# Patient Record
Sex: MALE | Race: WHITE | Employment: OTHER | URBAN - NONMETROPOLITAN AREA
[De-identification: names, ages, dates, MRNs, and addresses within clinical notes are randomized per-mention and may not be internally consistent; named-entity substitution may affect disease eponyms.]

---

## 2023-11-06 DIAGNOSIS — I48.92 ATRIAL FLUTTER, UNSPECIFIED TYPE (MULTI): ICD-10-CM

## 2023-11-06 DIAGNOSIS — I48.91 ATRIAL FIBRILLATION, TRANSIENT (MULTI): ICD-10-CM

## 2023-11-06 PROBLEM — I47.10 PAROXYSMAL SVT (SUPRAVENTRICULAR TACHYCARDIA) (CMS-HCC): Status: ACTIVE | Noted: 2023-11-06

## 2023-11-06 PROBLEM — R00.1 SINUS BRADYCARDIA: Status: ACTIVE | Noted: 2023-11-06

## 2023-11-06 PROBLEM — R55 SYNCOPE: Status: ACTIVE | Noted: 2023-11-06

## 2023-11-06 PROBLEM — Z79.01 ANTICOAGULATED: Status: ACTIVE | Noted: 2023-11-06

## 2023-11-06 PROBLEM — R00.2 PALPITATIONS: Status: ACTIVE | Noted: 2023-11-06

## 2023-11-06 RX ORDER — CHOLECALCIFEROL (VITAMIN D3) 50 MCG
1 TABLET ORAL DAILY
COMMUNITY

## 2023-11-06 RX ORDER — MULTIVITAMIN WITH IRON
1 TABLET ORAL DAILY
COMMUNITY

## 2023-11-06 RX ORDER — CALCIUM CARBONATE 300MG(750)
400 TABLET,CHEWABLE ORAL DAILY
COMMUNITY

## 2023-11-06 RX ORDER — FLECAINIDE ACETATE 50 MG/1
1 TABLET ORAL 2 TIMES DAILY
COMMUNITY
End: 2023-11-06 | Stop reason: SDUPTHER

## 2023-11-06 RX ORDER — METOPROLOL TARTRATE 25 MG/1
25 TABLET, FILM COATED ORAL DAILY
COMMUNITY

## 2023-11-07 RX ORDER — FLECAINIDE ACETATE 50 MG/1
50 TABLET ORAL 2 TIMES DAILY
Qty: 180 TABLET | Refills: 3 | Status: SHIPPED | OUTPATIENT
Start: 2023-11-07 | End: 2024-11-06

## 2024-02-23 ENCOUNTER — OFFICE VISIT (OUTPATIENT)
Dept: CARDIOLOGY | Facility: CLINIC | Age: 74
End: 2024-02-23
Payer: MEDICARE

## 2024-02-23 VITALS
BODY MASS INDEX: 23.55 KG/M2 | HEART RATE: 73 BPM | WEIGHT: 159 LBS | SYSTOLIC BLOOD PRESSURE: 100 MMHG | DIASTOLIC BLOOD PRESSURE: 80 MMHG | HEIGHT: 69 IN

## 2024-02-23 DIAGNOSIS — I48.3 TYPICAL ATRIAL FLUTTER (MULTI): Primary | ICD-10-CM

## 2024-02-23 DIAGNOSIS — R55 SYNCOPE, UNSPECIFIED SYNCOPE TYPE: ICD-10-CM

## 2024-02-23 DIAGNOSIS — Z79.01 ANTICOAGULATED: ICD-10-CM

## 2024-02-23 DIAGNOSIS — R00.2 PALPITATIONS: ICD-10-CM

## 2024-02-23 DIAGNOSIS — I47.10 PAROXYSMAL SVT (SUPRAVENTRICULAR TACHYCARDIA) (CMS-HCC): ICD-10-CM

## 2024-02-23 DIAGNOSIS — R00.1 SINUS BRADYCARDIA: ICD-10-CM

## 2024-02-23 DIAGNOSIS — I48.91 ATRIAL FIBRILLATION, TRANSIENT (MULTI): ICD-10-CM

## 2024-02-23 PROCEDURE — 1159F MED LIST DOCD IN RCRD: CPT | Performed by: INTERNAL MEDICINE

## 2024-02-23 PROCEDURE — 1160F RVW MEDS BY RX/DR IN RCRD: CPT | Performed by: INTERNAL MEDICINE

## 2024-02-23 PROCEDURE — 99214 OFFICE O/P EST MOD 30 MIN: CPT | Performed by: INTERNAL MEDICINE

## 2024-02-23 PROCEDURE — 3008F BODY MASS INDEX DOCD: CPT | Performed by: INTERNAL MEDICINE

## 2024-02-23 PROCEDURE — 93000 ELECTROCARDIOGRAM COMPLETE: CPT | Performed by: INTERNAL MEDICINE

## 2024-02-23 PROCEDURE — 1036F TOBACCO NON-USER: CPT | Performed by: INTERNAL MEDICINE

## 2024-02-23 RX ORDER — IPRATROPIUM BROMIDE 42 UG/1
2 SPRAY, METERED NASAL 4 TIMES DAILY
COMMUNITY

## 2024-02-23 NOTE — LETTER
"February 23, 2024     Joseph Mariscal MD  [address] [facility]  [address] [facility] [address]    Patient: Sanjay Cordova   YOB: 1950   Date of Visit: 2/23/2024       Dear Dr. Joseph Mariscal MD:    Thank you for referring Sanjay Cordova to me for evaluation. Below are my notes for this consultation.  If you have questions, please do not hesitate to call me. I look forward to following your patient along with you.       Sincerely,     Rich Branch MD      CC: No Recipients  ______________________________________________________________________________________    Subjective   Sanjay Cordova is a 73 y.o. male       Chief Complaint    Follow-up          HPI        Patient is here for follow-up continue management for history of atrial fibrillation/flutter, SVT, long-term anticoagulation.  Since last time I saw him he denies any cardiac complaint.  He feels well.  He denies lightheadedness, dizziness or syncope.  His only complaint is back pain.  He is entertaining about using medical marijuana for his back pain    Assessment     1. History of SVT with prior ablation no recurrence  2. Documentation of atrial fibrillation/flutter currently on suppressive dose of flecainide and anticoagulation with no recurrence  3. Previous history of syncope attributed to 1 and 2 resolved no recurrence  4. Fatigue due to sinus bradycardia related to antiarrhythmic improved since discontinuation of metoprolol  5. Chronic anticoagulation due to history of atrial fibrillation/flutter     Plan     1. I reviewed with the patient results of his EKG and lab work  2. Continue present medical regimen and continue to monitor heart rate  3. We will see him back in 6 months and follow-up      Review of Systems   All other systems reviewed and are negative.           Vitals:    02/23/24 1040   BP: 100/80   BP Location: Left arm   Patient Position: Sitting   Pulse: 73   Weight: 72.1 kg (159 lb)   Height: 1.753 m (5' 9\")    "     EKG done in office today      Objective   Physical Exam  Constitutional:       Appearance: Normal appearance. He is normal weight.   HENT:      Nose: Nose normal.   Neck:      Vascular: No carotid bruit.   Cardiovascular:      Rate and Rhythm: Normal rate.      Pulses: Normal pulses.      Heart sounds: Normal heart sounds.   Pulmonary:      Effort: Pulmonary effort is normal.   Abdominal:      General: Bowel sounds are normal.      Palpations: Abdomen is soft.   Genitourinary:     Rectum: Normal.   Musculoskeletal:         General: Normal range of motion.      Cervical back: Normal range of motion.      Right lower leg: No edema.      Left lower leg: No edema.   Skin:     General: Skin is warm and dry.   Neurological:      General: No focal deficit present.      Mental Status: He is alert.   Psychiatric:         Mood and Affect: Mood normal.         Behavior: Behavior normal.         Thought Content: Thought content normal.         Judgment: Judgment normal.         Allergies  Patient has no known allergies.     Current Medications    Current Outpatient Medications:   •  calcium carbonate (CALCIUM 500 ORAL), Take 1 tablet by mouth once daily., Disp: , Rfl:   •  cholecalciferol (Vitamin D-3) 50 MCG (2000 UT) tablet, Take 1 tablet (2,000 Units) by mouth once daily., Disp: , Rfl:   •  flecainide (Tambocor) 50 mg tablet, Take 1 tablet (50 mg) by mouth 2 times a day., Disp: 180 tablet, Rfl: 3  •  ipratropium (Atrovent) 42 mcg (0.06 %) nasal spray, 2 sprays 4 times a day., Disp: , Rfl:   •  magnesium oxide (Mag-Ox) 400 mg tablet, Take 1 tablet (400 mg) by mouth once daily., Disp: , Rfl:   •  metoprolol tartrate (Lopressor) 25 mg tablet, Take 1 tablet (25 mg) by mouth once daily. As needed, Disp: , Rfl:   •  multivitamin (Multiple Vitamins) tablet, Take 1 tablet by mouth once daily., Disp: , Rfl:   •  rivaroxaban (Xarelto) 20 mg tablet, Take 1 tablet (20 mg) by mouth once daily., Disp: , Rfl:                       Assessment/Plan   1. Typical atrial flutter (CMS/HCC)  Follow Up In Cardiology    ECG 12 Lead      2. Atrial fibrillation, transient (CMS/HCC)        3. Paroxysmal SVT (supraventricular tachycardia)        4. Palpitations        5. Sinus bradycardia        6. Anticoagulated        7. Syncope, unspecified syncope type        8. BMI 23.0-23.9, adult                 Scribe Attestation  By signing my name below, IMaría LPN  , Scribe   attest that this documentation has been prepared under the direction and in the presence of Rich Branch MD.     Provider Attestation - Scribe documentation    All medical record entries made by the Scribe were at my direction and personally dictated by me. I have reviewed the chart and agree that the record accurately reflects my personal performance of the history, physical exam, discussion and plan.

## 2024-02-23 NOTE — PROGRESS NOTES
"Subjective   Sanjay Cordova is a 73 y.o. male       Chief Complaint    Follow-up          HPI        Patient is here for follow-up continue management for history of atrial fibrillation/flutter, SVT, long-term anticoagulation.  Since last time I saw him he denies any cardiac complaint.  He feels well.  He denies lightheadedness, dizziness or syncope.  His only complaint is back pain.  He is entertaining about using medical marijuana for his back pain    Assessment     1. History of SVT with prior ablation no recurrence  2. Documentation of atrial fibrillation/flutter currently on suppressive dose of flecainide and anticoagulation with no recurrence  3. Previous history of syncope attributed to 1 and 2 resolved no recurrence  4. Fatigue due to sinus bradycardia related to antiarrhythmic improved since discontinuation of metoprolol  5. Chronic anticoagulation due to history of atrial fibrillation/flutter     Plan     1. I reviewed with the patient results of his EKG and lab work  2. Continue present medical regimen and continue to monitor heart rate  3. We will see him back in 6 months and follow-up      Review of Systems   All other systems reviewed and are negative.           Vitals:    02/23/24 1040   BP: 100/80   BP Location: Left arm   Patient Position: Sitting   Pulse: 73   Weight: 72.1 kg (159 lb)   Height: 1.753 m (5' 9\")        EKG done in office today      Objective   Physical Exam  Constitutional:       Appearance: Normal appearance. He is normal weight.   HENT:      Nose: Nose normal.   Neck:      Vascular: No carotid bruit.   Cardiovascular:      Rate and Rhythm: Normal rate.      Pulses: Normal pulses.      Heart sounds: Normal heart sounds.   Pulmonary:      Effort: Pulmonary effort is normal.   Abdominal:      General: Bowel sounds are normal.      Palpations: Abdomen is soft.   Genitourinary:     Rectum: Normal.   Musculoskeletal:         General: Normal range of motion.      Cervical back: Normal " range of motion.      Right lower leg: No edema.      Left lower leg: No edema.   Skin:     General: Skin is warm and dry.   Neurological:      General: No focal deficit present.      Mental Status: He is alert.   Psychiatric:         Mood and Affect: Mood normal.         Behavior: Behavior normal.         Thought Content: Thought content normal.         Judgment: Judgment normal.         Allergies  Patient has no known allergies.     Current Medications    Current Outpatient Medications:     calcium carbonate (CALCIUM 500 ORAL), Take 1 tablet by mouth once daily., Disp: , Rfl:     cholecalciferol (Vitamin D-3) 50 MCG (2000 UT) tablet, Take 1 tablet (2,000 Units) by mouth once daily., Disp: , Rfl:     flecainide (Tambocor) 50 mg tablet, Take 1 tablet (50 mg) by mouth 2 times a day., Disp: 180 tablet, Rfl: 3    ipratropium (Atrovent) 42 mcg (0.06 %) nasal spray, 2 sprays 4 times a day., Disp: , Rfl:     magnesium oxide (Mag-Ox) 400 mg tablet, Take 1 tablet (400 mg) by mouth once daily., Disp: , Rfl:     metoprolol tartrate (Lopressor) 25 mg tablet, Take 1 tablet (25 mg) by mouth once daily. As needed, Disp: , Rfl:     multivitamin (Multiple Vitamins) tablet, Take 1 tablet by mouth once daily., Disp: , Rfl:     rivaroxaban (Xarelto) 20 mg tablet, Take 1 tablet (20 mg) by mouth once daily., Disp: , Rfl:                      Assessment/Plan   1. Typical atrial flutter (CMS/HCC)  Follow Up In Cardiology    ECG 12 Lead      2. Atrial fibrillation, transient (CMS/HCC)        3. Paroxysmal SVT (supraventricular tachycardia)        4. Palpitations        5. Sinus bradycardia        6. Anticoagulated        7. Syncope, unspecified syncope type        8. BMI 23.0-23.9, adult                 Scribe Attestation  By signing my name below, María POSEY LPN  , Scribe   attest that this documentation has been prepared under the direction and in the presence of Rich Branch MD.     Provider Attestation - Scribe  documentation    All medical record entries made by the Scribe were at my direction and personally dictated by me. I have reviewed the chart and agree that the record accurately reflects my personal performance of the history, physical exam, discussion and plan.

## 2024-02-26 DIAGNOSIS — I48.91 ATRIAL FIBRILLATION, TRANSIENT (MULTI): ICD-10-CM

## 2024-07-09 ENCOUNTER — TELEPHONE (OUTPATIENT)
Dept: CARDIOLOGY | Facility: CLINIC | Age: 74
End: 2024-07-09
Payer: MEDICARE

## 2024-07-09 NOTE — TELEPHONE ENCOUNTER
Patient phoned requesting Xarelto hold instructions. He is scheduled for a Colonoscopy at [facility] with Dr. Schmitz 7/15/2024. Please advise.    To Dr. Rich Branch MD

## 2024-08-30 ENCOUNTER — APPOINTMENT (OUTPATIENT)
Dept: CARDIOLOGY | Facility: CLINIC | Age: 74
End: 2024-08-30
Payer: MEDICARE

## 2024-08-30 VITALS
SYSTOLIC BLOOD PRESSURE: 114 MMHG | WEIGHT: 153.8 LBS | BODY MASS INDEX: 22.78 KG/M2 | DIASTOLIC BLOOD PRESSURE: 70 MMHG | HEIGHT: 69 IN | HEART RATE: 65 BPM

## 2024-08-30 DIAGNOSIS — I48.91 ATRIAL FIBRILLATION, TRANSIENT (MULTI): Primary | ICD-10-CM

## 2024-08-30 DIAGNOSIS — Z78.9 NEVER SMOKED TOBACCO: ICD-10-CM

## 2024-08-30 DIAGNOSIS — R00.1 SINUS BRADYCARDIA: ICD-10-CM

## 2024-08-30 DIAGNOSIS — Z79.01 ANTICOAGULATED: ICD-10-CM

## 2024-08-30 DIAGNOSIS — R00.2 PALPITATIONS: ICD-10-CM

## 2024-08-30 DIAGNOSIS — I47.10 PAROXYSMAL SVT (SUPRAVENTRICULAR TACHYCARDIA) (CMS-HCC): ICD-10-CM

## 2024-08-30 DIAGNOSIS — I48.3 TYPICAL ATRIAL FLUTTER (MULTI): ICD-10-CM

## 2024-08-30 PROCEDURE — 1036F TOBACCO NON-USER: CPT | Performed by: INTERNAL MEDICINE

## 2024-08-30 PROCEDURE — 93000 ELECTROCARDIOGRAM COMPLETE: CPT | Performed by: INTERNAL MEDICINE

## 2024-08-30 PROCEDURE — 1160F RVW MEDS BY RX/DR IN RCRD: CPT | Performed by: INTERNAL MEDICINE

## 2024-08-30 PROCEDURE — 3008F BODY MASS INDEX DOCD: CPT | Performed by: INTERNAL MEDICINE

## 2024-08-30 PROCEDURE — 99214 OFFICE O/P EST MOD 30 MIN: CPT | Performed by: INTERNAL MEDICINE

## 2024-08-30 PROCEDURE — 1159F MED LIST DOCD IN RCRD: CPT | Performed by: INTERNAL MEDICINE

## 2024-08-30 ASSESSMENT — ENCOUNTER SYMPTOMS: DIZZINESS: 1

## 2024-08-30 NOTE — LETTER
August 30, 2024     Rich Branch MD  [address] [facility] [address]  [facility] [address]    Patient: Sanjay Cordova   YOB: 1950   Date of Visit: 8/30/2024       Dear Dr. Rich Branch MD:    Thank you for referring Sanjay Cordova to me for evaluation. Below are my notes for this consultation.  If you have questions, please do not hesitate to call me. I look forward to following your patient along with you.       Sincerely,     Rich Branch MD      CC: Joseph Mariscal MD  ______________________________________________________________________________________    Subjective   Sanjay Cordova is a 73 y.o. male       Chief Complaint    Follow-up          HPI        Patient is here for follow-up continue management for previous evaluation for atrial fibrillation/flutter with remote history of SVT ablation.  Currently has been on flecainide and anticoagulation and he has been stable.  He has not had any recurrence.  He described functional class I.  He remains very active.  He uses treadmill and walk 2 to 3 miles.  He denies chest pain, palpitation, lightheadedness, dizziness or syncope.    Assessment     1. History of SVT with prior ablation no recurrence  2. Documentation of atrial fibrillation/flutter currently on suppressive dose of flecainide and anticoagulation with no recurrence  3. Previous history of syncope attributed to 1 and 2 resolved no recurrence  4. Fatigue due to sinus bradycardia related to antiarrhythmic improved since discontinuation of metoprolol  5. Chronic anticoagulation due to history of atrial fibrillation/flutter.  Well-tolerated     Plan     1. I reviewed with the patient results of his EKG and lab work  2. Continue present medical regimen and continue to monitor heart rate  3. We will see him back in 6 months and follow-up      Review of Systems   Neurological:  Positive for dizziness.   All other systems reviewed and are negative.           Vitals:    08/30/24 1028  "  BP: 114/70   BP Location: Right arm   Patient Position: Sitting   Pulse: 65   Weight: 69.8 kg (153 lb 12.8 oz)   Height: 1.753 m (5' 9\")        Objective   Physical Exam  Constitutional:       Appearance: Normal appearance.   HENT:      Nose: Nose normal.   Neck:      Vascular: No carotid bruit.   Cardiovascular:      Rate and Rhythm: Normal rate.      Pulses: Normal pulses.      Heart sounds: Normal heart sounds.   Pulmonary:      Effort: Pulmonary effort is normal.   Abdominal:      General: Bowel sounds are normal.      Palpations: Abdomen is soft.   Musculoskeletal:         General: Normal range of motion.      Cervical back: Normal range of motion.      Right lower leg: No edema.      Left lower leg: No edema.   Skin:     General: Skin is warm and dry.   Neurological:      General: No focal deficit present.      Mental Status: He is alert.   Psychiatric:         Mood and Affect: Mood normal.         Behavior: Behavior normal.         Thought Content: Thought content normal.         Judgment: Judgment normal.         Allergies  Patient has no known allergies.     Current Medications    Current Outpatient Medications:   •  calcium carbonate (CALCIUM 500 ORAL), Take 1 tablet by mouth once daily., Disp: , Rfl:   •  cholecalciferol (Vitamin D-3) 50 MCG (2000 UT) tablet, Take 1 tablet (2,000 Units) by mouth once daily., Disp: , Rfl:   •  flecainide (Tambocor) 50 mg tablet, Take 1 tablet (50 mg) by mouth 2 times a day., Disp: 180 tablet, Rfl: 3  •  ipratropium (Atrovent) 42 mcg (0.06 %) nasal spray, 2 sprays 4 times a day., Disp: , Rfl:   •  magnesium oxide (Mag-Ox) 400 mg tablet, Take 1 tablet (400 mg) by mouth once daily., Disp: , Rfl:   •  multivitamin (Multiple Vitamins) tablet, Take 1 tablet by mouth once daily., Disp: , Rfl:   •  rivaroxaban (Xarelto) 20 mg tablet, Take 1 tablet (20 mg) by mouth once daily., Disp: 90 tablet, Rfl: 3                     Assessment/Plan   1. Atrial fibrillation, transient (Multi) "  Follow Up In Cardiology    ECG 12 Lead    rivaroxaban (Xarelto) 20 mg tablet      2. Typical atrial flutter (Multi)  Follow Up In Cardiology      3. Paroxysmal SVT (supraventricular tachycardia) (CMS-HCC)        4. Palpitations        5. Sinus bradycardia        6. Anticoagulated        7. BMI 23.0-23.9, adult        8. Never smoked tobacco                 Scribe Attestation  By signing my name below, Danae POSEY LPN, Scribe   attest that this documentation has been prepared under the direction and in the presence of Rich Branch MD.     Provider Attestation - Scribe documentation    All medical record entries made by the Scribe were at my direction and personally dictated by me. I have reviewed the chart and agree that the record accurately reflects my personal performance of the history, physical exam, discussion and plan.

## 2024-08-30 NOTE — PATIENT INSTRUCTIONS
Please bring all medicines, vitamins, and herbal supplements with you when you come to the office.    Prescriptions will not be filled unless you are compliant with your follow up appointments or have a follow up appointment scheduled as per instruction of your physician. Refills should be requested at the time of your visit.     6 months

## 2024-08-30 NOTE — PROGRESS NOTES
"Subjective   Sanjay Cordova is a 73 y.o. male       Chief Complaint    Follow-up          HPI        Patient is here for follow-up continue management for previous evaluation for atrial fibrillation/flutter with remote history of SVT ablation.  Currently has been on flecainide and anticoagulation and he has been stable.  He has not had any recurrence.  He described functional class I.  He remains very active.  He uses treadmill and walk 2 to 3 miles.  He denies chest pain, palpitation, lightheadedness, dizziness or syncope.    Assessment     1. History of SVT with prior ablation no recurrence  2. Documentation of atrial fibrillation/flutter currently on suppressive dose of flecainide and anticoagulation with no recurrence  3. Previous history of syncope attributed to 1 and 2 resolved no recurrence  4. Fatigue due to sinus bradycardia related to antiarrhythmic improved since discontinuation of metoprolol  5. Chronic anticoagulation due to history of atrial fibrillation/flutter.  Well-tolerated     Plan     1. I reviewed with the patient results of his EKG and lab work  2. Continue present medical regimen and continue to monitor heart rate  3. We will see him back in 6 months and follow-up      Review of Systems   Neurological:  Positive for dizziness.   All other systems reviewed and are negative.           Vitals:    08/30/24 1028   BP: 114/70   BP Location: Right arm   Patient Position: Sitting   Pulse: 65   Weight: 69.8 kg (153 lb 12.8 oz)   Height: 1.753 m (5' 9\")        Objective   Physical Exam  Constitutional:       Appearance: Normal appearance.   HENT:      Nose: Nose normal.   Neck:      Vascular: No carotid bruit.   Cardiovascular:      Rate and Rhythm: Normal rate.      Pulses: Normal pulses.      Heart sounds: Normal heart sounds.   Pulmonary:      Effort: Pulmonary effort is normal.   Abdominal:      General: Bowel sounds are normal.      Palpations: Abdomen is soft.   Musculoskeletal:         General: " Normal range of motion.      Cervical back: Normal range of motion.      Right lower leg: No edema.      Left lower leg: No edema.   Skin:     General: Skin is warm and dry.   Neurological:      General: No focal deficit present.      Mental Status: He is alert.   Psychiatric:         Mood and Affect: Mood normal.         Behavior: Behavior normal.         Thought Content: Thought content normal.         Judgment: Judgment normal.         Allergies  Patient has no known allergies.     Current Medications    Current Outpatient Medications:     calcium carbonate (CALCIUM 500 ORAL), Take 1 tablet by mouth once daily., Disp: , Rfl:     cholecalciferol (Vitamin D-3) 50 MCG (2000 UT) tablet, Take 1 tablet (2,000 Units) by mouth once daily., Disp: , Rfl:     flecainide (Tambocor) 50 mg tablet, Take 1 tablet (50 mg) by mouth 2 times a day., Disp: 180 tablet, Rfl: 3    ipratropium (Atrovent) 42 mcg (0.06 %) nasal spray, 2 sprays 4 times a day., Disp: , Rfl:     magnesium oxide (Mag-Ox) 400 mg tablet, Take 1 tablet (400 mg) by mouth once daily., Disp: , Rfl:     multivitamin (Multiple Vitamins) tablet, Take 1 tablet by mouth once daily., Disp: , Rfl:     rivaroxaban (Xarelto) 20 mg tablet, Take 1 tablet (20 mg) by mouth once daily., Disp: 90 tablet, Rfl: 3                     Assessment/Plan   1. Atrial fibrillation, transient (Multi)  Follow Up In Cardiology    ECG 12 Lead    rivaroxaban (Xarelto) 20 mg tablet      2. Typical atrial flutter (Multi)  Follow Up In Cardiology      3. Paroxysmal SVT (supraventricular tachycardia) (CMS-[facility])        4. Palpitations        5. Sinus bradycardia        6. Anticoagulated        7. BMI 23.0-23.9, adult        8. Never smoked tobacco                 Scribe Attestation  By signing my name below, Danae POSEY LPN   , Scrtasha   attest that this documentation has been prepared under the direction and in the presence of Rich Branch MD.     Provider Attestation - Scribe  documentation    All medical record entries made by the Scribe were at my direction and personally dictated by me. I have reviewed the chart and agree that the record accurately reflects my personal performance of the history, physical exam, discussion and plan.

## 2024-10-24 DIAGNOSIS — I48.91 ATRIAL FIBRILLATION, TRANSIENT (MULTI): ICD-10-CM

## 2024-10-24 DIAGNOSIS — I48.92 ATRIAL FLUTTER, UNSPECIFIED TYPE (MULTI): ICD-10-CM

## 2024-10-24 RX ORDER — FLECAINIDE ACETATE 50 MG/1
50 TABLET ORAL 2 TIMES DAILY
Qty: 180 TABLET | Refills: 3 | Status: SHIPPED | OUTPATIENT
Start: 2024-10-24 | End: 2025-10-24

## 2025-03-03 ENCOUNTER — APPOINTMENT (OUTPATIENT)
Dept: CARDIOLOGY | Facility: CLINIC | Age: 75
End: 2025-03-03
Payer: MEDICARE

## 2025-03-03 VITALS
SYSTOLIC BLOOD PRESSURE: 118 MMHG | HEIGHT: 69 IN | DIASTOLIC BLOOD PRESSURE: 80 MMHG | HEART RATE: 63 BPM | BODY MASS INDEX: 23.55 KG/M2 | WEIGHT: 159 LBS

## 2025-03-03 DIAGNOSIS — I48.91 ATRIAL FIBRILLATION, TRANSIENT (MULTI): Primary | ICD-10-CM

## 2025-03-03 DIAGNOSIS — I48.3 TYPICAL ATRIAL FLUTTER (MULTI): ICD-10-CM

## 2025-03-03 DIAGNOSIS — R00.1 SINUS BRADYCARDIA: ICD-10-CM

## 2025-03-03 DIAGNOSIS — Z78.9 NEVER SMOKED TOBACCO: ICD-10-CM

## 2025-03-03 DIAGNOSIS — R00.2 PALPITATIONS: ICD-10-CM

## 2025-03-03 DIAGNOSIS — I47.10 PAROXYSMAL SVT (SUPRAVENTRICULAR TACHYCARDIA) (CMS-HCC): ICD-10-CM

## 2025-03-03 DIAGNOSIS — Z79.01 ANTICOAGULATED: ICD-10-CM

## 2025-03-03 PROCEDURE — 1160F RVW MEDS BY RX/DR IN RCRD: CPT | Performed by: INTERNAL MEDICINE

## 2025-03-03 PROCEDURE — 1036F TOBACCO NON-USER: CPT | Performed by: INTERNAL MEDICINE

## 2025-03-03 PROCEDURE — 3008F BODY MASS INDEX DOCD: CPT | Performed by: INTERNAL MEDICINE

## 2025-03-03 PROCEDURE — 1159F MED LIST DOCD IN RCRD: CPT | Performed by: INTERNAL MEDICINE

## 2025-03-03 PROCEDURE — 99214 OFFICE O/P EST MOD 30 MIN: CPT | Performed by: INTERNAL MEDICINE

## 2025-03-03 PROCEDURE — 93000 ELECTROCARDIOGRAM COMPLETE: CPT | Performed by: INTERNAL MEDICINE

## 2025-03-03 NOTE — PROGRESS NOTES
"Subjective   Sanjay Cordova is a 74 y.o. male       Chief Complaint    Follow-up          HPI        Patient is here for follow-up continue management for history of atrial fibrillation, SVT and long-term anticoagulation.  Since last time I saw him he denies any complaint of chest pain, palpitation, lightheadedness, dizziness or syncope.  The patient remain active.  [place] functional class I.  His recent laboratory data noted and reviewed with him.    Assessment     1. History of SVT with prior ablation no recurrence  2. Documentation of atrial fibrillation/flutter currently on suppressive dose of flecainide and anticoagulation with no recurrence  3. Previous history of syncope attributed to 1 and 2 resolved no recurrence  4. Fatigue due to sinus bradycardia related to antiarrhythmic improved since discontinuation of metoprolol  5. Chronic anticoagulation due to history of atrial fibrillation/flutter.  Well-tolerated     Plan     1. I reviewed with the patient results of his EKG and lab work  2. Continue present medical regimen and continue to monitor heart rate  3. We will see him back in 6 months and follow-up      Review of Systems   All other systems reviewed and are negative.           Vitals:    03/03/25 1020   BP: 118/80   BP Location: Left arm   Patient Position: Sitting   Pulse: 63   Weight: 72.1 kg (159 lb)   Height: 1.753 m (5' 9\")      EKG done in office today      Objective   Physical Exam  Constitutional:       Appearance: Normal appearance.   HENT:      Nose: Nose normal.   Neck:      Vascular: No carotid bruit.   Cardiovascular:      Rate and Rhythm: Normal rate.      Pulses: Normal pulses.      Heart sounds: Normal heart sounds.   Pulmonary:      Effort: Pulmonary effort is normal.   Abdominal:      General: Bowel sounds are normal.      Palpations: Abdomen is soft.   Musculoskeletal:         General: Normal range of motion.      Cervical back: Normal range of motion.      Right lower leg: No edema. "      Left lower leg: No edema.   Skin:     General: Skin is warm and dry.   Neurological:      General: No focal deficit present.      Mental Status: He is alert.   Psychiatric:         Mood and Affect: Mood normal.         Behavior: Behavior normal.         Thought Content: Thought content normal.         Judgment: Judgment normal.         Allergies  Patient has no known allergies.     Current Medications    Current Outpatient Medications:     calcium carbonate (CALCIUM 500 ORAL), Take 1 tablet by mouth once daily., Disp: , Rfl:     cholecalciferol (Vitamin D-3) 50 MCG (2000 UT) tablet, Take 1 tablet (2,000 Units) by mouth once daily., Disp: , Rfl:     flecainide (Tambocor) 50 mg tablet, Take 1 tablet (50 mg) by mouth 2 times a day., Disp: 180 tablet, Rfl: 3    magnesium oxide (Mag-Ox) 400 mg tablet, Take 1 tablet (400 mg) by mouth once daily., Disp: , Rfl:     multivitamin (Multiple Vitamins) tablet, Take 1 tablet by mouth once daily., Disp: , Rfl:     rivaroxaban (Xarelto) 20 mg tablet, Take 1 tablet (20 mg) by mouth once daily., Disp: 90 tablet, Rfl: 3                     Assessment/Plan   1. Sinus bradycardia        2. Atrial fibrillation, transient (Multi)  Follow Up In Cardiology      3. Paroxysmal SVT (supraventricular tachycardia) (CMS-HCC)        4. Palpitations        5. Typical atrial flutter (Multi)        6. Anticoagulated        7. BMI 23.0-23.9, adult        8. Never smoked tobacco                 Scribe Attestation  By signing my name below, Danae POSEY LPN, Scribe   attest that this documentation has been prepared under the direction and in the presence of Rich Branch MD.     Provider Attestation - Scribe documentation    All medical record entries made by the Scribe were at my direction and personally dictated by me. I have reviewed the chart and agree that the record accurately reflects my personal performance of the history, physical exam, discussion and plan.

## 2025-03-03 NOTE — LETTER
"March 3, 2025     Joseph Mariscal MD  [address] [place] [id number]    Patient: Sanjay Cordova   YOB: 1950   Date of Visit: 3/3/2025       Dear Dr. Joseph Mariscal MD:    Thank you for referring Sanjay Cordova to me for evaluation. Below are my notes for this consultation.  If you have questions, please do not hesitate to call me. I look forward to following your patient along with you.       Sincerely,     Rich Branch MD      CC: No Recipients  ______________________________________________________________________________________    Subjective   Sanjay Cordova is a 74 y.o. male       Chief Complaint    Follow-up          HPI        Patient is here for follow-up continue management for history of atrial fibrillation, SVT and long-term anticoagulation.  Since last time I saw him he denies any complaint of chest pain, palpitation, lightheadedness, dizziness or syncope.  The patient remain active.  Scruff functional class I.  His recent laboratory data noted and reviewed with him.    Assessment     1. History of SVT with prior ablation no recurrence  2. Documentation of atrial fibrillation/flutter currently on suppressive dose of flecainide and anticoagulation with no recurrence  3. Previous history of syncope attributed to 1 and 2 resolved no recurrence  4. Fatigue due to sinus bradycardia related to antiarrhythmic improved since discontinuation of metoprolol  5. Chronic anticoagulation due to history of atrial fibrillation/flutter.  Well-tolerated     Plan     1. I reviewed with the patient results of his EKG and lab work  2. Continue present medical regimen and continue to monitor heart rate  3. We will see him back in 6 months and follow-up      Review of Systems   All other systems reviewed and are negative.           Vitals:    03/03/25 1020   BP: 118/80   BP Location: Left arm   Patient Position: Sitting   Pulse: 63   Weight: 72.1 kg (159 lb)   Height: 1.753 m (5' 9\")      EKG done " in office today      Objective   Physical Exam  Constitutional:       Appearance: Normal appearance.   HENT:      Nose: Nose normal.   Neck:      Vascular: No carotid bruit.   Cardiovascular:      Rate and Rhythm: Normal rate.      Pulses: Normal pulses.      Heart sounds: Normal heart sounds.   Pulmonary:      Effort: Pulmonary effort is normal.   Abdominal:      General: Bowel sounds are normal.      Palpations: Abdomen is soft.   Musculoskeletal:         General: Normal range of motion.      Cervical back: Normal range of motion.      Right lower leg: No edema.      Left lower leg: No edema.   Skin:     General: Skin is warm and dry.   Neurological:      General: No focal deficit present.      Mental Status: He is alert.   Psychiatric:         Mood and Affect: Mood normal.         Behavior: Behavior normal.         Thought Content: Thought content normal.         Judgment: Judgment normal.         Allergies  Patient has no known allergies.     Current Medications    Current Outpatient Medications:   •  calcium carbonate (CALCIUM 500 ORAL), Take 1 tablet by mouth once daily., Disp: , Rfl:   •  cholecalciferol (Vitamin D-3) 50 MCG (2000 UT) tablet, Take 1 tablet (2,000 Units) by mouth once daily., Disp: , Rfl:   •  flecainide (Tambocor) 50 mg tablet, Take 1 tablet (50 mg) by mouth 2 times a day., Disp: 180 tablet, Rfl: 3  •  magnesium oxide (Mag-Ox) 400 mg tablet, Take 1 tablet (400 mg) by mouth once daily., Disp: , Rfl:   •  multivitamin (Multiple Vitamins) tablet, Take 1 tablet by mouth once daily., Disp: , Rfl:   •  rivaroxaban (Xarelto) 20 mg tablet, Take 1 tablet (20 mg) by mouth once daily., Disp: 90 tablet, Rfl: 3                     Assessment/Plan   1. Sinus bradycardia        2. Atrial fibrillation, transient (Multi)  Follow Up In Cardiology      3. Paroxysmal SVT (supraventricular tachycardia) (CMS-HCC)        4. Palpitations        5. Typical atrial flutter (Multi)        6. Anticoagulated        7. BMI  23.0-23.9, adult        8. Never smoked tobacco                 Scribe Attestation  By signing my name below, Danae POSEY LPN, Scribe   attest that this documentation has been prepared under the direction and in the presence of Rich Branch MD.     Provider Attestation - Scribe documentation    All medical record entries made by the Scribe were at my direction and personally dictated by me. I have reviewed the chart and agree that the record accurately reflects my personal performance of the history, physical exam, discussion and plan.

## 2025-03-03 NOTE — PATIENT INSTRUCTIONS
Please bring all medicines, vitamins, and herbal supplements with you when you come to the office.    Prescriptions will not be filled unless you are compliant with your follow up appointments or have a follow up appointment scheduled as per instruction of your physician. Refills should be requested at the time of your visit.     6 months  Same medications.

## 2025-09-03 ENCOUNTER — APPOINTMENT (OUTPATIENT)
Dept: CARDIOLOGY | Facility: CLINIC | Age: 75
End: 2025-09-03
Payer: MEDICARE

## 2025-09-26 ENCOUNTER — APPOINTMENT (OUTPATIENT)
Dept: CARDIOLOGY | Facility: CLINIC | Age: 75
End: 2025-09-26
Payer: MEDICARE

## 2025-10-20 ENCOUNTER — APPOINTMENT (OUTPATIENT)
Dept: CARDIOLOGY | Facility: CLINIC | Age: 75
End: 2025-10-20
Payer: MEDICARE